# Patient Record
Sex: MALE | Race: WHITE | NOT HISPANIC OR LATINO | Employment: FULL TIME | URBAN - METROPOLITAN AREA
[De-identification: names, ages, dates, MRNs, and addresses within clinical notes are randomized per-mention and may not be internally consistent; named-entity substitution may affect disease eponyms.]

---

## 2018-02-26 ENCOUNTER — OFFICE VISIT - HEALTHEAST (OUTPATIENT)
Dept: PODIATRY | Facility: CLINIC | Age: 53
End: 2018-02-26

## 2018-02-26 DIAGNOSIS — G57.61 MORTON'S NEUROMA OF RIGHT FOOT: ICD-10-CM

## 2018-03-01 ENCOUNTER — COMMUNICATION - HEALTHEAST (OUTPATIENT)
Dept: OTHER | Facility: CLINIC | Age: 53
End: 2018-03-01

## 2018-03-20 ENCOUNTER — RECORDS - HEALTHEAST (OUTPATIENT)
Dept: ADMINISTRATIVE | Facility: OTHER | Age: 53
End: 2018-03-20

## 2018-03-21 ENCOUNTER — COMMUNICATION - HEALTHEAST (OUTPATIENT)
Dept: OTHER | Facility: CLINIC | Age: 53
End: 2018-03-21

## 2018-04-03 ENCOUNTER — RECORDS - HEALTHEAST (OUTPATIENT)
Dept: ADMINISTRATIVE | Facility: OTHER | Age: 53
End: 2018-04-03

## 2021-06-16 NOTE — PROGRESS NOTES
Subjective findings: The patient presented to the clinic today complaining of some moderate pain on the ball of his right foot.  He stated that the pain has improved over the past several weeks.  The pain was aggravated with weightbearing and ambulation.  It was a mild aching type pain.    Objective findings: General: Nails bilateral feet normal length and color.  Skin bilateral feet warm supple and intact.  No skin lesions are noted.  DP  and PT pulses +2/4 bilateral feet.  Capillary refill less than 2 seconds bilateral feet.  Negative clonus, negative Babinski bilateral feet.  There is a negative Cale sign noted third intermetatarsal space right foot.  There is pain on palpation of the third intermetatarsal space right foot.  Range of motion within normal limits bilaterally.  Muscle power +5/5 bilaterally in all compartments.     Assessment: Vikram's neuroma right foot      Plan: I have recommended a new pair of orthotics with a neuroma plug.